# Patient Record
Sex: FEMALE | Race: BLACK OR AFRICAN AMERICAN | HISPANIC OR LATINO | Employment: UNEMPLOYED | URBAN - METROPOLITAN AREA
[De-identification: names, ages, dates, MRNs, and addresses within clinical notes are randomized per-mention and may not be internally consistent; named-entity substitution may affect disease eponyms.]

---

## 2019-08-09 PROCEDURE — 86481 TB AG RESPONSE T-CELL SUSP: CPT | Performed by: NURSE PRACTITIONER

## 2019-08-12 ENCOUNTER — TELEPHONE (OUTPATIENT)
Dept: URGENT CARE | Facility: CLINIC | Age: 69
End: 2019-08-12

## 2019-08-12 NOTE — TELEPHONE ENCOUNTER
Spoke with pt about her t spot and needing additional testing. Pt cannot come back to [facility] to have that done. She will call her employer. Recommended either [facility] or [facility] in [place] where she lives

## 2019-08-12 NOTE — TELEPHONE ENCOUNTER
Called pt about her t spot results. She will need to talk to her employer about needing another test at [email or web address]

## 2019-08-20 ENCOUNTER — OFFICE VISIT (OUTPATIENT)
Dept: INTERNAL MEDICINE | Facility: CLINIC | Age: 69
End: 2019-08-20

## 2019-08-20 ENCOUNTER — HOSPITAL ENCOUNTER (OUTPATIENT)
Dept: GENERAL RADIOLOGY | Facility: HOSPITAL | Age: 69
Discharge: HOME | End: 2019-08-20
Admitting: INTERNAL MEDICINE

## 2019-08-20 VITALS
TEMPERATURE: 96.9 F | SYSTOLIC BLOOD PRESSURE: 178 MMHG | HEIGHT: 65 IN | WEIGHT: 167 LBS | OXYGEN SATURATION: 97 % | RESPIRATION RATE: 16 BRPM | DIASTOLIC BLOOD PRESSURE: 81 MMHG | BODY MASS INDEX: 27.82 KG/M2 | HEART RATE: 53 BPM

## 2019-08-20 DIAGNOSIS — Z00.00 ROUTINE GENERAL MEDICAL EXAMINATION AT A HEALTH CARE FACILITY: Primary | ICD-10-CM

## 2019-08-20 DIAGNOSIS — I10 BENIGN ESSENTIAL HYPERTENSION: Primary | ICD-10-CM

## 2019-08-20 DIAGNOSIS — E78.2 MIXED HYPERLIPIDEMIA: ICD-10-CM

## 2019-08-20 DIAGNOSIS — R79.89 ABNORMAL THYROID BLOOD TEST: ICD-10-CM

## 2019-08-20 DIAGNOSIS — Z12.31 ENCOUNTER FOR SCREENING MAMMOGRAM FOR MALIGNANT NEOPLASM OF BREAST: ICD-10-CM

## 2019-08-20 PROCEDURE — 99204 OFFICE O/P NEW MOD 45 MIN: CPT | Performed by: INTERNAL MEDICINE

## 2019-08-20 PROCEDURE — 71046 X-RAY EXAM CHEST 2 VIEWS: CPT

## 2019-08-20 RX ORDER — AMLODIPINE BESYLATE 5 MG/1
5 TABLET ORAL NIGHTLY
Qty: 30 TABLET | Refills: 5 | Status: SHIPPED | OUTPATIENT
Start: 2019-08-20

## 2019-08-20 RX ORDER — METOPROLOL SUCCINATE 50 MG/1
50 TABLET, EXTENDED RELEASE ORAL DAILY
COMMUNITY
End: 2019-08-20

## 2019-08-20 RX ORDER — LOSARTAN POTASSIUM 100 MG/1
100 TABLET ORAL DAILY
Qty: 30 TABLET | Refills: 5 | Status: SHIPPED | OUTPATIENT
Start: 2019-08-20

## 2019-08-20 RX ORDER — SERTRALINE HYDROCHLORIDE 25 MG/1
25 TABLET, FILM COATED ORAL DAILY
Qty: 30 TABLET | Refills: 5 | Status: SHIPPED | OUTPATIENT
Start: 2019-08-20

## 2019-08-20 NOTE — PROGRESS NOTES
Subjective   Suyapa Sinha is a 69 y.o. female.     Chief Complaint   Patient presents with   • Establish Care   • Hypertension   • Hyperlipidemia   • Anxiety   • Depression       History of Present Illness   HPI: Patient is here for an initial visit and to follow up on the blood pressure which is noted to be elevated. the patient is taking the blood pressure medications as prescribed and has had no side effects. The patient is also here to follow up on the cholesterol and is trying to follow a diet. The patient is  Also here to abnormal thyroid and was hyperthyroid /[facility]  In the past and saw endocrinology in [facility] and is  due to get lab work done .  The patient also needs refills on medications .  Patient  Also needs mammogram, she is currently living with her son in [place] and will be moving to [place] , patient also complains of anxiety and depression and states she is stressed out, she was on Zoloft in the past but this discontinued it and at times takes it as needed  Hyperlipidemia   Pertinent negatives include no chest pain or shortness of breath.   Hypertension   Pertinent negatives include no chest pain, palpitations or shortness of breath.    Review of Systems   Constitutional: Negative for appetite change, fatigue and fever.   HENT: Negative for congestion, ear discharge, ear pain, sinus pressure and sore throat.    Eyes: Negative for pain and discharge.   Respiratory: Negative for cough, chest tightness, shortness of breath and wheezing.    Cardiovascular: Negative for chest pain, palpitations and leg swelling.   Gastrointestinal: Negative for abdominal pain, blood in stool, constipation, diarrhea and nausea.   Endocrine: Negative for cold intolerance and heat intolerance.   Genitourinary: Negative for dysuria, flank pain and frequency.   Musculoskeletal: Negative for back pain and joint swelling.   Skin: Negative for color change.   Allergic/Immunologic: Negative for environmental allergies  "and food allergies.   Neurological: Negative for dizziness, weakness, numbness and headaches.   Hematological: Negative for adenopathy. Does not bruise/bleed easily.   Psychiatric/Behavioral: Positive for dysphoric mood. Negative for behavioral problems. The patient is nervous/anxious.        Past Medical History:   Diagnosis Date   • Hypertension    • Thyroid disease     [facility]  - dr leeann PACHECO       Past Surgical History:   Procedure Laterality Date   • HYSTERECTOMY  2017       Family History   Problem Relation Age of Onset   • Diabetes Mother    • Hypertension Mother    • Cancer Father         prostate   • Hypertension Father    • Thyroid disease Sister    • Cancer Paternal Uncle         prostate        reports that she has never smoked. She has never used smokeless tobacco. Drug use questions deferred to the physician. She reports that she does not drink alcohol.    No Known Allergies        Current Outpatient Medications:   •  amLODIPine (NORVASC) 5 MG tablet, Take 1 tablet by mouth Every Night., Disp: 30 tablet, Rfl: 5  •  losartan (COZAAR) 100 MG tablet, Take 1 tablet by mouth Daily., Disp: 30 tablet, Rfl: 5  •  sertraline (ZOLOFT) 25 MG tablet, Take 1 tablet by mouth Daily., Disp: 30 tablet, Rfl: 5      Objective   Blood pressure 178/81, pulse 53, temperature 96.9 °F (36.1 °C), resp. rate 16, height 165.1 cm (65\"), weight 75.8 kg (167 lb), SpO2 97 %.    Physical Exam   Constitutional: She is oriented to person, place, and time. She appears well-developed and well-nourished. No distress.   HENT:   Head: Normocephalic and atraumatic.   Right Ear: External ear normal.   Left Ear: External ear normal.   Nose: Nose normal.   Mouth/Throat: Oropharynx is clear and moist.   Eyes: Conjunctivae and EOM are normal. Pupils are equal, round, and reactive to light.   Neck: Neck supple. No thyromegaly present.   Cardiovascular: Normal rate, regular rhythm and normal heart sounds.   Pulmonary/Chest: Effort " normal and breath sounds normal. No respiratory distress.   Abdominal: Soft. Bowel sounds are normal. She exhibits no distension. There is no tenderness. There is no rebound.   Musculoskeletal: Normal range of motion. She exhibits no edema.   Lymphadenopathy:     She has no cervical adenopathy.   Neurological: She is alert and oriented to person, place, and time.   No gross motor or sensory deficits   Skin: Skin is warm. She is not diaphoretic.   Psychiatric: She has a normal mood and affect.   Nursing note and vitals reviewed.      Patient's Body mass index is 27.79 kg/m². BMI is within normal parameters. No follow-up required..      Results for orders placed or performed during the hospital encounter of 08/09/19   TSPOT   Result Value Ref Range    TSPOTTB  Negative     Non-reportable due to insufficient cells.  Need 2x10e6 of peripheral blood mononuclear cells (PBMC) from whole blood. Recommend recollection of specimen. Two full tubes required for retest.    T-SPOT Panel A 0     T-SPOT Panel B 0     TSPOT NIL CONTROL INTERP Failed     TSPOT POS CONTROL INTERP Failed          Assessment/Plan   Suyapa was seen today for establish care, hypertension, hyperlipidemia, anxiety and depression.    Diagnoses and all orders for this visit:    Benign essential hypertension    Mixed hyperlipidemia    Abnormal thyroid blood test    Encounter for screening mammogram for malignant neoplasm of breast  -     Mammo Screening Digital Tomosynthesis Bilateral With CAD    Other orders  -     losartan (COZAAR) 100 MG tablet; Take 1 tablet by mouth Daily.  -     amLODIPine (NORVASC) 5 MG tablet; Take 1 tablet by mouth Every Night.  -     sertraline (ZOLOFT) 25 MG tablet; Take 1 tablet by mouth Daily.      Plan:  1.  Benign essential hypertension: Will  Start norvasc 5 mg qhs and losartan 100 mg po qd ,  low-sodium diet advised  2.mixed hyperlipidemia: will obtain   fasting CMP and lipid panel.  Diet and exercise counseled,    3.  Abnormal  thyroid test : will get labs  4.  Anxiety disorder: We will start Zoloft 25 mg daily and obtain labs  5.  Major depression: We will start Zoloft  6.Screening for breast cancer : will schedule mammogram        Lima Ha MD

## 2019-08-23 LAB
GAMMA INTERFERON BACKGROUND BLD IA-ACNC: 0.12 IU/ML
M TB IFN-G BLD-IMP: NEGATIVE
M TB IFN-G CD4+ BCKGRND COR BLD-ACNC: 0.07 IU/ML
MITOGEN IGNF BLD-ACNC: >10 IU/ML
QUANTIFERON INCUBATION: NORMAL
QUANTIFERON TB2 AG VALUE: 0.13 IU/ML
SERVICE CMNT-IMP: NORMAL

## 2019-10-09 ENCOUNTER — APPOINTMENT (OUTPATIENT)
Dept: MAMMOGRAPHY | Facility: HOSPITAL | Age: 69
End: 2019-10-09

## 2019-10-14 ENCOUNTER — APPOINTMENT (OUTPATIENT)
Dept: MAMMOGRAPHY | Facility: HOSPITAL | Age: 69
End: 2019-10-14

## 2019-11-06 ENCOUNTER — LAB (OUTPATIENT)
Dept: LAB | Facility: HOSPITAL | Age: 69
End: 2019-11-06

## 2019-11-06 ENCOUNTER — TRANSCRIBE ORDERS (OUTPATIENT)
Dept: LAB | Facility: HOSPITAL | Age: 69
End: 2019-11-06

## 2019-11-06 DIAGNOSIS — E05.00 BASEDOW'S DISEASE: Primary | ICD-10-CM

## 2019-11-06 DIAGNOSIS — E05.00 BASEDOW'S DISEASE: ICD-10-CM

## 2019-11-06 LAB
ALBUMIN SERPL-MCNC: 4.1 G/DL (ref 3.5–5.2)
ALBUMIN/GLOB SERPL: 1.2 G/DL
ALP SERPL-CCNC: 97 U/L (ref 39–117)
ALT SERPL W P-5'-P-CCNC: 8 U/L (ref 1–33)
ANION GAP SERPL CALCULATED.3IONS-SCNC: 11 MMOL/L (ref 5–15)
AST SERPL-CCNC: 13 U/L (ref 1–32)
BILIRUB SERPL-MCNC: 0.2 MG/DL (ref 0.2–1.2)
BUN BLD-MCNC: 10 MG/DL (ref 8–23)
BUN/CREAT SERPL: 13 (ref 7–25)
CALCIUM SPEC-SCNC: 9.9 MG/DL (ref 8.6–10.5)
CHLORIDE SERPL-SCNC: 102 MMOL/L (ref 98–107)
CO2 SERPL-SCNC: 28 MMOL/L (ref 22–29)
CREAT BLD-MCNC: 0.77 MG/DL (ref 0.57–1)
GFR SERPL CREATININE-BSD FRML MDRD: 90 ML/MIN/1.73
GLOBULIN UR ELPH-MCNC: 3.5 GM/DL
GLUCOSE BLD-MCNC: 100 MG/DL (ref 65–99)
POTASSIUM BLD-SCNC: 3.7 MMOL/L (ref 3.5–5.2)
PROT SERPL-MCNC: 7.6 G/DL (ref 6–8.5)
SODIUM BLD-SCNC: 141 MMOL/L (ref 136–145)
T3FREE SERPL-MCNC: 3.13 PG/ML (ref 2–4.4)
T4 FREE SERPL-MCNC: 1.17 NG/DL (ref 0.93–1.7)
T4 SERPL-MCNC: 7.63 MCG/DL (ref 4.5–11.7)
TSH SERPL DL<=0.05 MIU/L-ACNC: 0.83 UIU/ML (ref 0.27–4.2)

## 2019-11-06 PROCEDURE — 36415 COLL VENOUS BLD VENIPUNCTURE: CPT | Performed by: INTERNAL MEDICINE

## 2019-11-06 PROCEDURE — 86800 THYROGLOBULIN ANTIBODY: CPT

## 2019-11-06 PROCEDURE — 84439 ASSAY OF FREE THYROXINE: CPT | Performed by: INTERNAL MEDICINE

## 2019-11-06 PROCEDURE — 84481 FREE ASSAY (FT-3): CPT

## 2019-11-06 PROCEDURE — 84443 ASSAY THYROID STIM HORMONE: CPT

## 2019-11-06 PROCEDURE — 80053 COMPREHEN METABOLIC PANEL: CPT | Performed by: INTERNAL MEDICINE

## 2019-11-06 PROCEDURE — 84432 ASSAY OF THYROGLOBULIN: CPT

## 2019-11-06 PROCEDURE — 86376 MICROSOMAL ANTIBODY EACH: CPT

## 2019-11-06 PROCEDURE — 83520 IMMUNOASSAY QUANT NOS NONAB: CPT

## 2019-11-06 PROCEDURE — 84479 ASSAY OF THYROID (T3 OR T4): CPT

## 2019-11-07 LAB
T3RU NFR SERPL: 27 % (ref 24–39)
THYROGLOB AB SERPL-ACNC: <1 IU/ML (ref 0–0.9)
THYROPEROXIDASE AB SERPL-ACNC: 41 IU/ML (ref 0–34)
TSH RECEP AB SER-ACNC: <1.1 IU/L (ref 0–1.75)

## 2019-11-11 LAB — THYROGLOBULIN (TG-RIA): 11 NG/ML

## 2020-08-24 ENCOUNTER — TELEPHONE (OUTPATIENT)
Dept: INTERNAL MEDICINE | Facility: CLINIC | Age: 70
End: 2020-08-24

## 2023-11-09 ENCOUNTER — APPOINTMENT (OUTPATIENT)
Dept: GENERAL RADIOLOGY | Facility: HOSPITAL | Age: 73
End: 2023-11-09
Payer: OTHER MISCELLANEOUS

## 2023-11-09 ENCOUNTER — HOSPITAL ENCOUNTER (EMERGENCY)
Facility: HOSPITAL | Age: 73
Discharge: HOME | End: 2023-11-10
Attending: EMERGENCY MEDICINE
Payer: OTHER MISCELLANEOUS

## 2023-11-09 DIAGNOSIS — M25.511 ACUTE PAIN OF RIGHT SHOULDER: Primary | ICD-10-CM

## 2023-11-09 LAB
ALBUMIN SERPL-MCNC: 4.8 G/DL (ref 3.5–5.2)
ALBUMIN/GLOB SERPL: 1.5 G/DL
ALP SERPL-CCNC: 99 U/L (ref 39–117)
ALT SERPL W P-5'-P-CCNC: 10 U/L (ref 1–33)
ANION GAP SERPL CALCULATED.3IONS-SCNC: 13 MMOL/L (ref 5–15)
AST SERPL-CCNC: 16 U/L (ref 1–32)
BASOPHILS # BLD AUTO: 0.1 10*3/MM3 (ref 0–0.2)
BASOPHILS NFR BLD AUTO: 0.9 % (ref 0–1.5)
BILIRUB SERPL-MCNC: <0.2 MG/DL (ref 0–1.2)
BUN SERPL-MCNC: 21 MG/DL (ref 8–23)
BUN/CREAT SERPL: 15.1 (ref 7–25)
CALCIUM SPEC-SCNC: 10.8 MG/DL (ref 8.6–10.5)
CHLORIDE SERPL-SCNC: 100 MMOL/L (ref 98–107)
CO2 SERPL-SCNC: 28 MMOL/L (ref 22–29)
CREAT SERPL-MCNC: 1.39 MG/DL (ref 0.57–1)
DEPRECATED RDW RBC AUTO: 42.4 FL (ref 37–54)
EGFRCR SERPLBLD CKD-EPI 2021: 40.4 ML/MIN/1.73
EOSINOPHIL # BLD AUTO: 0.2 10*3/MM3 (ref 0–0.4)
EOSINOPHIL NFR BLD AUTO: 2.7 % (ref 0.3–6.2)
ERYTHROCYTE [DISTWIDTH] IN BLOOD BY AUTOMATED COUNT: 14.2 % (ref 12.3–15.4)
GLOBULIN UR ELPH-MCNC: 3.2 GM/DL
GLUCOSE SERPL-MCNC: 120 MG/DL (ref 65–99)
HCT VFR BLD AUTO: 35.9 % (ref 34–46.6)
HGB BLD-MCNC: 12 G/DL (ref 12–15.9)
HOLD SPECIMEN: NORMAL
LYMPHOCYTES # BLD AUTO: 1.6 10*3/MM3 (ref 0.7–3.1)
LYMPHOCYTES NFR BLD AUTO: 22.2 % (ref 19.6–45.3)
MCH RBC QN AUTO: 28.5 PG (ref 26.6–33)
MCHC RBC AUTO-ENTMCNC: 33.5 G/DL (ref 31.5–35.7)
MCV RBC AUTO: 84.9 FL (ref 79–97)
MONOCYTES # BLD AUTO: 0.7 10*3/MM3 (ref 0.1–0.9)
MONOCYTES NFR BLD AUTO: 9.9 % (ref 5–12)
NEUTROPHILS NFR BLD AUTO: 4.5 10*3/MM3 (ref 1.7–7)
NEUTROPHILS NFR BLD AUTO: 64.3 % (ref 42.7–76)
NRBC BLD AUTO-RTO: 0 /100 WBC (ref 0–0.2)
PLATELET # BLD AUTO: 370 10*3/MM3 (ref 140–450)
PMV BLD AUTO: 6.9 FL (ref 6–12)
POTASSIUM SERPL-SCNC: 3.6 MMOL/L (ref 3.5–5.2)
PROT SERPL-MCNC: 8 G/DL (ref 6–8.5)
RBC # BLD AUTO: 4.23 10*6/MM3 (ref 3.77–5.28)
SODIUM SERPL-SCNC: 141 MMOL/L (ref 136–145)
TROPONIN T SERPL HS-MCNC: 9 NG/L
WBC NRBC COR # BLD: 7.1 10*3/MM3 (ref 3.4–10.8)
WHOLE BLOOD HOLD COAG: NORMAL

## 2023-11-09 PROCEDURE — 84484 ASSAY OF TROPONIN QUANT: CPT

## 2023-11-09 PROCEDURE — 99284 EMERGENCY DEPT VISIT MOD MDM: CPT

## 2023-11-09 PROCEDURE — 71046 X-RAY EXAM CHEST 2 VIEWS: CPT

## 2023-11-09 PROCEDURE — 93005 ELECTROCARDIOGRAM TRACING: CPT

## 2023-11-09 PROCEDURE — 85025 COMPLETE CBC W/AUTO DIFF WBC: CPT

## 2023-11-09 PROCEDURE — 73030 X-RAY EXAM OF SHOULDER: CPT

## 2023-11-09 PROCEDURE — 80053 COMPREHEN METABOLIC PANEL: CPT

## 2023-11-09 RX ORDER — SODIUM CHLORIDE 0.9 % (FLUSH) 0.9 %
10 SYRINGE (ML) INJECTION AS NEEDED
Status: DISCONTINUED | OUTPATIENT
Start: 2023-11-09 | End: 2023-11-10 | Stop reason: HOSPADM

## 2023-11-09 RX ORDER — ASPIRIN 325 MG
325 TABLET ORAL ONCE
Status: COMPLETED | OUTPATIENT
Start: 2023-11-09 | End: 2023-11-09

## 2023-11-09 RX ADMIN — ASPIRIN 325 MG ORAL TABLET 325 MG: 325 PILL ORAL at 22:42

## 2023-11-09 NOTE — Clinical Note
[facility] EMERGENCY DEPARTMENT  [address] [place] [address]  Phone: [phone]    Suyapa Sinha was seen and treated in our emergency department on 11/9/2023.  She may return to work on 11/12/2023.         Thank you for choosing [facility].    Anthony Perez MD

## 2023-11-10 VITALS
RESPIRATION RATE: 18 BRPM | DIASTOLIC BLOOD PRESSURE: 80 MMHG | SYSTOLIC BLOOD PRESSURE: 130 MMHG | BODY MASS INDEX: 25.93 KG/M2 | HEART RATE: 60 BPM | OXYGEN SATURATION: 99 % | TEMPERATURE: 98.1 F | HEIGHT: 65 IN | WEIGHT: 155.65 LBS

## 2023-11-10 LAB
GEN 5 2HR TROPONIN T REFLEX: 7 NG/L
QT INTERVAL: 362 MS
QTC INTERVAL: 380 MS
TROPONIN T DELTA: -2 NG/L

## 2023-11-10 PROCEDURE — 84484 ASSAY OF TROPONIN QUANT: CPT

## 2023-11-10 PROCEDURE — 36415 COLL VENOUS BLD VENIPUNCTURE: CPT

## 2023-11-10 NOTE — ED PROVIDER NOTES
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  She states it hurts so bad she could not move it.  And then it seemed to gradually get better towards sore just to movement.  No direct trauma or fall she does a lot of heavy pulling and repetitive activity with it.  There was no chest pain neck arm jaw pain or shortness of breath sweating nausea or vomiting or sweating with it.  The patient denies any leg pain or swelling no recent long car ride plane ride immobilization foreign travels no neck pain no numbness or tingling in the extremity.  No fever chills or night sweats.      Review of Systems   Constitutional:  Negative for chills and fever.   Respiratory:  Negative for cough, chest tightness and shortness of breath.    Cardiovascular:  Negative for chest pain and palpitations.   Gastrointestinal:  Negative for abdominal pain and vomiting.   Genitourinary:  Negative for difficulty urinating and dysuria.   Musculoskeletal:  Negative for back pain and neck pain.   Neurological:  Negative for dizziness and light-headedness.   Psychiatric/Behavioral:  Negative for confusion.        Past Medical History:   Diagnosis Date    Hypertension     Thyroid disease     [facility]  - dr leeann PACHECO       No Known Allergies    Past Surgical History:   Procedure Laterality Date    HYSTERECTOMY  2017       Family History   Problem Relation Age of Onset    Diabetes Mother     Hypertension Mother     Cancer Father         prostate    Hypertension Father     Thyroid disease Sister     Cancer Paternal Uncle         prostate       Social History     Socioeconomic History    Marital status: Single   Tobacco Use    Smoking status: Never    Smokeless tobacco: Never   Substance and Sexual  Activity    Alcohol use: No    Drug use: Defer    Sexual activity: Defer     Prior to Admission medications    Medication Sig Start Date End Date Taking? Authorizing Provider   amLODIPine (NORVASC) 5 MG tablet Take 1 tablet by mouth Every Night. 8/20/19   Lima Ha MD   losartan (COZAAR) 100 MG tablet Take 1 tablet by mouth Daily. 8/20/19   Lima Ha MD   sertraline (ZOLOFT) 25 MG tablet Take 1 tablet by mouth Daily. 8/20/19   Lima Ha MD          Objective   Physical Exam  Constitutional is a 73-year-old female awake alert no acute distress.  Triage vital signs reviewed HEENT extraocular muscles are intact pupils equal round reactive sclera clear mouth clear neck supple no adenopathy no JV no bruits lungs clear no retraction heart regular without murmur abdomen soft nontender good bowel sounds no peritoneal findings or pulsatile masses extremities pulses equal throughout upper and lower extremities no edema cords or Homans' sign or evidence of DVT.  Skin warm and dry without rashes or cellulitic changes examination of the right shoulder in particular she has point tenderness noted to the anterior shoulder at the biceps tendon proximally.  There is no redness no warmth it is reproducible and hurts to move it but joint has full range of motion without any difficulty whatsoever no evidence of septic joint no axillary nodes no swelling to the arm hands warm dry good cap refill patient has good and equal radial pulses.  Patient has no cords throughout the arm compartments are soft to palpation.  Distally neurovascular motor strength including deltoid tricep forearm hand area.  This is reproducible at the bicep tendon insertion to the anterior shoulder.   strength normal the patient can move her wrist and fingers flex and extend without difficulty hitchhike make an okay sign test little finger spread and open and close her fist without difficulty.  Neurologic awake alert follows commands motor  strength normal reflexes 2+ symmetrical without weakness no cervical spine tenderness no JVD no bruits.  Procedures           ED Course      Results for orders placed or performed during the hospital encounter of 11/09/23   Comprehensive Metabolic Panel    Specimen: Blood   Result Value Ref Range    Glucose 120 (H) 65 - 99 mg/dL    BUN 21 8 - 23 mg/dL    Creatinine 1.39 (H) 0.57 - 1.00 mg/dL    Sodium 141 136 - 145 mmol/L    Potassium 3.6 3.5 - 5.2 mmol/L    Chloride 100 98 - 107 mmol/L    CO2 28.0 22.0 - 29.0 mmol/L    Calcium 10.8 (H) 8.6 - 10.5 mg/dL    Total Protein 8.0 6.0 - 8.5 g/dL    Albumin 4.8 3.5 - 5.2 g/dL    ALT (SGPT) 10 1 - 33 U/L    AST (SGOT) 16 1 - 32 U/L    Alkaline Phosphatase 99 39 - 117 U/L    Total Bilirubin <0.2 0.0 - 1.2 mg/dL    Globulin 3.2 gm/dL    A/G Ratio 1.5 g/dL    BUN/Creatinine Ratio 15.1 7.0 - 25.0    Anion Gap 13.0 5.0 - 15.0 mmol/L    eGFR 40.4 (L) >60.0 mL/min/1.73   High Sensitivity Troponin T    Specimen: Blood   Result Value Ref Range    HS Troponin T 9 <14 ng/L   CBC Auto Differential    Specimen: Blood   Result Value Ref Range    WBC 7.10 3.40 - 10.80 10*3/mm3    RBC 4.23 3.77 - 5.28 10*6/mm3    Hemoglobin 12.0 12.0 - 15.9 g/dL    Hematocrit 35.9 34.0 - 46.6 %    MCV 84.9 79.0 - 97.0 fL    MCH 28.5 26.6 - 33.0 pg    MCHC 33.5 31.5 - 35.7 g/dL    RDW 14.2 12.3 - 15.4 %    RDW-SD 42.4 37.0 - 54.0 fl    MPV 6.9 6.0 - 12.0 fL    Platelets 370 140 - 450 10*3/mm3    Neutrophil % 64.3 42.7 - 76.0 %    Lymphocyte % 22.2 19.6 - 45.3 %    Monocyte % 9.9 5.0 - 12.0 %    Eosinophil % 2.7 0.3 - 6.2 %    Basophil % 0.9 0.0 - 1.5 %    Neutrophils, Absolute 4.50 1.70 - 7.00 10*3/mm3    Lymphocytes, Absolute 1.60 0.70 - 3.10 10*3/mm3    Monocytes, Absolute 0.70 0.10 - 0.90 10*3/mm3    Eosinophils, Absolute 0.20 0.00 - 0.40 10*3/mm3    Basophils, Absolute 0.10 0.00 - 0.20 10*3/mm3    nRBC 0.0 0.0 - 0.2 /100 WBC   High Sensitivity Troponin T 2Hr    Specimen: Blood   Result Value Ref Range     HS Troponin T 7 <14 ng/L    Troponin T Delta -2 >=-4 - <+4 ng/L   ECG 12 Lead Chest Pain   Result Value Ref Range    QT Interval 362 ms    QTC Interval 380 ms   Gold Top - SST   Result Value Ref Range    Extra Tube Hold for add-ons.    Light Blue Top   Result Value Ref Range    Extra Tube Hold for add-ons.      XR Shoulder 2+ View Right    Result Date: 11/9/2023  Impression: No acute osseous abnormality. Mild to moderate acromioclavicular and glenohumeral osteoarthritic changes are present. Electronically Signed: Judy Sena MD  11/9/2023 11:14 PM EST  Workstation ID: [id number]    XR Chest 2 View    Result Date: 11/9/2023  No acute cardiopulmonary abnormality. Electronically Signed: Natividad Santana MD  11/9/2023 10:40 PM EST  Workstation ID: [id number]   Medications   aspirin tablet 325 mg (325 mg Oral Given 11/9/23 2242)          EKG my interpretation normal sinus rhythm rate of 66  Atrial enlargement QTc 380 after some Q waves noted anteriorly some abnormal EKG I have nothing old here to compare with but when records reviewed at [facility] from 2017 I cannot see the EKG but the report says that there looks like evidence of previous anterior septal infarct.                                  Medical Decision Making  Medical decision making.  IV established monitor placed my review of sinus rhythm EKG my independent review shows a normal sinus rhythm rate of 66 some atrial enlargement normal axis no hypertrophy Q waves noted anterior septal waves anteriorly septally.  Is abnormal EKG I have nothing here to compare with but when you go through her records at [facility] 2017 the report is the same it says anterior septal Q waves I cannot see the KGB testing report from cardiology.  Labs obtained my independent review comprehensive metabolic profile unremarkable and a creatinine 1.39.  The patient troponin was 9 CBC was unremarkable.  Patient GFR 40.4.  The patient has a CBC unremarkable repeat troponin was 7 2 hours  later.  X-ray of the shoulder obtained my independent review no fracture or dislocation.  No effusion radiology nothing acute mild to moderate AC and glenohumeral arthritis.  Chest x-ray obtained my independent review I do not see evidence of pneumonia pneumothorax cardiomegaly or acute abnormalities.  Radiology read the same.  The patient repeat exam is resting comfortably at this point she tells me that she just had a stress test in the office 2 weeks ago which was unremarkable I am unable to see this but she assures me that it was okay.  Patient has point tenderness at the shoulder at the bicipital tendon insertion out symptoms of septic joint also evidence of myocardial infarction DVT pulmonary embolism or dissection compartment syndrome infected joint arterial compromise cauda equina epidural abscess spinal cord issue or acute cardiac issue.  Although not a complete list of all possibilities.  Heart score of 3 reported recent stress test unremarkable.  I believe this is an isolated shoulder issue she states she could not hardly move it when it started happening and it seems reproducible seems to be an inflammatory process based on exam.  Patient will be discharged home for outpatient management follow-up we had a long discussion about what to return for and she voiced understanding discharged home stable unremarkable ER course.    Problems Addressed:  Acute pain of right shoulder: complicated acute illness or injury    Amount and/or Complexity of Data Reviewed  External Data Reviewed: ECG.  Radiology: ordered and independent interpretation performed. Decision-making details documented in ED Course.  ECG/medicine tests: ordered and independent interpretation performed. Decision-making details documented in ED Course.        Final diagnoses:   Acute pain of right shoulder       ED Disposition  ED Disposition       ED Disposition   Discharge    Condition   Stable    Comment   --               Neto Smtih  MD BRANDON  [address] [place] [address] Dr Marvin SOLANO  [place] [address]  [phone]    In 1 day           Medication List      No changes were made to your prescriptions during this visit.            Anthony Perez MD  11/10/23 0430

## 2023-11-10 NOTE — DISCHARGE INSTRUCTIONS
Tylenol for pain May use ibuprofen sparingly too much of this may elevate your blood pressure.  Salonpas patches.  Off work tomorrow.  Return for chest pain neck arm jaw pain shortness of breath dizziness passing out red hot swollen arm cool discolored hand or any other new or worsening problems or concerns return admitted to the ER.  Appears to be an inflammatory issue to the anterior shoulder at the insertion of the bicep tendon.

## 2023-11-10 NOTE — ED TRIAGE NOTES
Pt arrived via [facility] c/o right shoulder pain that started at approx 2000. Pt reports the pain is constant and does not radiate.

## 2024-07-11 NOTE — PROGRESS NOTES
Chief complaint:   Chief Complaint   Patient presents with    Follow-up     2 month follow up       Vitals:  Visit Vitals  BP (!) 88/58   Pulse (!) 58   Resp 18   Ht 5' 4.5\" (1.638 m)   Wt 78 kg (172 lb)   SpO2 96%   BMI 29.07 kg/m²           HISTORY OF PRESENT ILLNESS     HPI     Problem List    Coronary artery disease  Coronary angiogram 5/2/24 s/p PTCA and stenting of mid LAD 50-60% stenosis reduced to 0% with a 3.0 mm x 15 mm Biotronik drug-eluting stent.   Coronary angiogram 08/31/2018 revealed mid LAD to Dist LAD lesion with 35% stenosis, prox Cx to Dist Cx lesion with 25% stenosis  Hypertension  Hyperlipidemia      Luis Galindo is a 84 year old female was seen in [facility] cardiology clinic for consultation and office visit on 7/25/2024 at 11:37 AM.       Luis Galindo  has a past medical history of BREAST CANCER (10/01/2005), Essential hypertension, benign, GERD, Keratosis, actinic, Myopia with presbyopia (11/7/2013), and Pure hypercholesterolemia.    She has no past medical history of Diabetes mellitus  (CMD) or Thyroid condition.     Patient presented for cardiology consult after PCP visit 08/10/2018; NM ST 06/28/2018 obtained for this purpose negative for ischemia. Echo 05/22/2015 unremarkable. Patient's only other significant PMH is for hypertension and hyperlipidemia. However, PCP concerned regarding possibility of multivessel disease due to calcification noted on chest CT 07/02/2018. Referred to cardiology for further evaluation.     At office visit on 12/9/19, patient presented with continued complaints of SERRANO - sudden onset. Advised patient to consider coronary angiogram due to possibility of false positive NM ST. Coronary angiogram 08/31/2018 revealed mid LAD to Dist LAD lesion with 35% stenosis, prox Cx to Dist Cx lesion with 25% stenosis, EF 65%.    Patient presented to the ED on 5/1/24 complaining of chest pain. Patient reports intermittent substernal chest pain radiating to her  Labs ok ,patient needs a copy of  Her labs throat with intensity 8/10. She reports associated diaphoresis, denied any palpitation cough or fever. At the time of our evaluation in the ED following medication picture was chest pain-free. Patient's medical history include GERD, hyperlipidemia essential hypertension. Patients tropnonins continued to upward trend >2000. She underwent LHC and had a stent placed in the mid LAD. Patient tolerated procedure well. Echo prior to LHC revealed an EF of 28% with regional WMA. She was started on GDMT. After procedure patient had episode of sundowning, received ativan.     Patient has been complaining of ongoing dizziness after stent. Patient brought in for nurse visit that reveal normal pressures and EKG.     At last visit on 5/23/2024, patient presents with two of her friends. Patient reports to have a cough and some dizziness, balance is not very good. Reports some orthopnea, does not wake up at night short of breath. Denies history of allergies. Was advised echocardiogram, NT proBNP and Cardiac Rehab Phase II.     NT proBNP 5/23/2024: 668    Echo 7/12/2024: LVEF 67%; Grade I LV diastolic dysfunction; RVSP 40 mmHg; EF improved from 28% (5/2/2024).     At today's visit, patient reports that her legs are not strong and she is unable to walk a lot. Sleeps okay at night. She does report shortness of breath and stairs can be difficult to climb. She reports that using her walker is not comfortable. Otherwise denies Chest Pain, Dizziness, Palpitations, Syncope, PND, Orthopnea, Ankle Edema, Claudication and Fatigue. No other concerns at this time.     PAST MEDICAL, FAMILY AND SOCIAL HISTORY     Medications:  Current Outpatient Medications   Medication Sig Dispense Refill    pantoprazole (PROTONIX) 20 MG tablet Take 1 tablet by mouth daily. 31 tablet 11    aspirin (ECOTRIN) 81 MG EC tablet Take 1 tablet by mouth daily. 90 tablet 3    clopidogrel (PLAVIX) 75 MG tablet Take 1 tablet by mouth daily. 90 tablet 3    empagliflozin  (JARDIANCE) 10 MG tablet Take 1 tablet by mouth daily. 90 tablet 3    metoPROLOL succinate (TOPROL-XL) 25 MG 24 hr tablet Take 1 tablet by mouth daily. 90 tablet 3    sacubitril-valsartan (ENTRESTO) 24-26 MG per tablet Take 1 tablet by mouth every 12 hours. 180 tablet 3    rosuvastatin (CRESTOR) 40 MG tablet Take 1 tablet by mouth daily. 90 tablet 3    spironolactone (ALDACTONE) 25 MG tablet Take 1 tablet by mouth daily. 90 tablet 3    Cholecalciferol (vitamin D3) 125 mcg (5,000 units) capsule Take 1 capsule by mouth daily. 90 capsule 3    furosemide (LASIX) 20 MG tablet Take 1 tablet by mouth every 72 hours. 30 tablet 3    galantamine (RAZADYNE) 4 MG tablet Take 1 tablet by mouth in the morning and 1 tablet in the evening. Take with meals. 180 tablet 3    sertraline (ZOLOFT) 100 MG tablet Take 2 tablets by mouth daily. 180 tablet 3    MELATONIN PO Take 1 tablet by mouth nightly as needed (sleep).      acetaminophen (Acetaminophen 8 Hour) 650 MG CR tablet Take 1-2 tablets daily. (Patient taking differently: Take 1-2 tablets by mouth daily as needed (headaches or pain).)      Multiple Vitamins-Minerals (MULTI FOR HER 50+) Cap Take 1 capsule by mouth daily. Indications: Women- brand       No current facility-administered medications for this visit.       Allergies:  ALLERGIES:   Allergen Reactions    Bee Sting ANAPHYLAXIS     Itching, throat irritation, rash at site    Prednisone Nausea & Vomiting    Donepezil Other (See Comments)     nightmares       Past Medical  History/Surgeries:  Past Medical History:   Diagnosis Date    BREAST CANCER 10/01/2005    left breast T1N0M0 radiation/surgery    Essential hypertension, benign     GERD     Dr Beach; EGD 11/09: Chronic reflux and mild erosive esophagitis    Keratosis, actinic     Myopia with presbyopia 11/7/2013    Pure hypercholesterolemia        Past Surgical History:   Procedure Laterality Date    Appendectomy  1969    Incidental at time of tubal ligation    Biopsy of  breast, incisional  10/01/2005    left - invasive well differentiated ductal carcinoma grade 1 ER+, NE-, Her-2/syed-, T1N0M0. Negative for lymphovascular invasion. Some peripheral invasion was appreciated. Surgical margins resected were negative. [place] node biopsy was negative    Cardiac catheterization/possible ptca/possible stent  08/31/2018    Dr. Parrish:  Findings: Left Main Coronary artery: Normal; Left Anterior descending artery: Normal; Left Circumflex coronary artery: Normal; Right Coronary artery: Normal ; Left Ventriculogram: Normal.EF 65%. LVEDP 19 mmHg    Dexa bone density axial skeleton  12/11/2009    normal bone mineral density per WHO criteria    Esophagogastroduodenoscopy transoral flex w/bx single or mult  11/3/2009    Dr Beach; Chronic reflux and mild erosive esophagitis    Extraocular muscle surg proc unlisted  01/01/2008    left eye laser surgery    Laparoscopy,tubal cautery  1969    Tubal Ligation    Mastectomy partial  10/10/2005    Left Mastectomy, partial    Remove tonsils/adenoids,<13 y/o  01/01/1950    T & A    Remv 2nd cataract,corn-scler sectn  2008    Left    Total abdom hysterectomy      Total Abd Hyst w BSO bleeding    Xray mammogram diagnostic left  11/04/2009    left  BI RADS Category 2, benign       Family History:  Family History   Problem Relation Age of Onset    Cancer Mother         breast cancer/breast    Neurological Disorder Mother         dementia    Heart Father         heart attack    Lung Disease Father         COPD    Dementia/Alzheimers Sister     Patient is unaware of any medical problems Maternal Grandmother     Patient is unaware of any medical problems Maternal Grandfather     Patient is unaware of any medical problems Paternal Grandmother     Patient is unaware of any medical problems Paternal Grandfather     Cancer, Breast Daughter        Social History:  Social History     Tobacco Use    Smoking status: Former     Current packs/day: 0.00     Average packs/day:  1.5 packs/day for 40.0 years (60.0 ttl pk-yrs)     Types: Cigarettes     Start date: 1963     Quit date: 2003     Years since quittin.5     Passive exposure: Past    Smokeless tobacco: Never   Substance Use Topics    Alcohol use: Not Currently       REVIEW OF SYSTEMS     Review of Systems   Constitutional:  Negative for activity change, appetite change, fatigue and unexpected weight change.   Respiratory:  Negative for apnea, chest tightness and shortness of breath.    Cardiovascular:  Negative for chest pain, palpitations and leg swelling.   Musculoskeletal:  Negative for myalgias.   Skin:  Negative for pallor.   Neurological:  Negative for dizziness, syncope, light-headedness and numbness.   All other systems reviewed and are negative.      PHYSICAL EXAM     Physical Exam  Vitals reviewed.   Constitutional:       Appearance: She is well-developed.   HENT:      Head: Normocephalic.      Neck: Neck supple.   Eyes:      Pupils: Pupils are equal, round, and reactive to light.   Neck:      Thyroid: No thyromegaly.      Vascular: No JVD.      Trachea: No tracheal deviation.   Cardiovascular:      Rate and Rhythm: Normal rate and regular rhythm.      Heart sounds: Normal heart sounds, S1 normal and S2 normal. No murmur heard.     No systolic murmur is present.      No diastolic murmur is present.      No friction rub. No gallop.   Pulmonary:      Effort: Pulmonary effort is normal. No respiratory distress.      Breath sounds: Normal breath sounds. No decreased breath sounds, wheezing or rales.   Chest:      Chest wall: No tenderness.   Abdominal:      Palpations: Abdomen is soft.   Musculoskeletal:         General: Normal range of motion.   Skin:     General: Skin is warm.   Neurological:      Mental Status: She is alert and oriented to person, place, and time.       Echo 2024:  Final Impressions    * Normal left ventricular systolic function with ejection fraction of 67 %.    * Normal left ventricular  wall thickness.    * Grade I left ventricular diastolic dysfunction.    * Mild tricuspid valve regurgitation.    * Right ventricular systolic pressure; 40 mmHg.    * Trace mitral valve regurgitation.    * Trace pulmonic regurgitation.    * No pericardial effusion.    * Compared to prior study 5/2/2024, The EF has improved.    EKG 6/15/2024:      EKG 5/13/24:      Coronary angiogram 5/2/24:  Successful PTCA and stenting of mid LAD 50-60% stenosis reduced to 0% with a 3.0 mm x 15 mm Biotronik drug-eluting stent.  QUANG 2 flow improved to QUANG 3 flow.  Intravascular coronary ultrasound done post and pre deployment.  IFR done prior to deployment.  DAPT therapy for 12 months  High-dose statins and beta-blockers    EKG 5/2/24      Echo 5/2/24:  Final Impressions    * Severely reduced left ventricular systolic function with ejection fraction  of 28 %.    * The apical septum, apical lateral wall, apical cap, mid anterior wall, mid  inferoseptal, basal anteroseptal, and mid anteroseptal are hypokinetic.    * Grade I left ventricular diastolic dysfunction.    * Mildly increased left ventricular wall thickness.    * LV Global longitudinal strain -7.0 %.    * Moderately enlarged left atrial chamber size.    * Mild mitral valve regurgitation.    * Mild tricuspid valve regurgitation.    * Right ventricular systolic pressure; 45 mmHg.    * No pericardial effusion.    * Compared to prior study 5/22/2015 Ejection fraction is reduced from 62%  with wall motion abnormality affecting the left anterior descending artery  territory.    EKG 7/4/20      Coronary Angiogram 08/31/2018  Key Findings/Plan  Mid LAD to Dist LAD lesion with 35% stenosis.  Prox Cx to Dist Cx lesion with 25% stenosis.  The ejection fraction is estimated to be 65%.     Left Main Coronary artery: Normal; Left Anterior descending artery: Mild disease; Left Circumflex coronary artery: Mild disease; Right Coronary artery: Normal ; Left Ventriculogram: Normal.  LV  ejection fraction: 65%.  Left ventricular end-diastolic pressure of 19 mmHg.     Plan  .  Treat Hypertension  .  Treat Left ventricular end-diastolic pressure    CT Neck/Chest 07/02/2018  1.  No mass, fluid collection or abnormal lymph node corresponding to the  patient reported palpable abnormality in the left submandibular region.  Subjacent to the marker indicating the palpable abnormality are small,  benign-appearing lymph nodes and normal left submandibular gland, symmetric  with the right.  2.  No evidence of metastatic disease in the neck or chest.  3.  Scattered nonspecific pulmonary micro nodules.  4.  Coronary artery, aortic and carotid artery atherosclerosis.    NM Stress Test 6/28/2018  IMPRESSION:  Normal perfusion scan the myocardium.  Normal LV systolic function.     CT Abdomen/Pelvis 09/17/2017  Diverticulosis of colon with minimal diverticulitis at sigmoid colon.  Moderate atherosclerotic change of the abdominal aorta and its major  branches.  Small hepatic cyst.    Echo 05/22/2015  No mass or thrombus visualized.  Agitated saline was injected through a peripheral vein and did NOT show evidence of a shunt.  Normal left ventricular size, systolic function and wall thickness, with no regional wall motion abnormalities.  Left ventricular ejection fraction, 62 %.  Mildly increased left atrial size. Normal right atrial size.  Normal right ventricular size and systolic function.  Structurally normal aortic valve without stenosis or regurgitation.  Minimal mitral valve sclerosis without stenosis.  Mild MV regurgitation. Trace TV and PV regurgitation.  Normal pulmonary artery systolic pressure 27 mmHg.  No echocardiogram to compare.    ASSESSMENT/PLAN     Recent Labs   Lab 07/16/24  0953 06/27/24  1141 06/15/24  1042 06/04/24  1446 05/16/24  1124 05/07/24  1144 05/05/24  1349 05/05/24  0438 05/03/24  1821 05/03/24  0643 05/02/24  1503 05/02/24  0554 05/02/24  0553 05/02/24  0054 05/01/24  2144  04/05/24  1208 12/04/23  0933   HGB  --  13.8 12.5  --   --  14.0  --  13.1  --  12.3  --  14.0  --    < > 15.3  --  15.1   BUN 23* 32* 28* 23* 30* 18  --  16   < > 12  --   --  17  --  19   < > 19   Creatinine 1.30* 1.43* 1.16* 1.33* 1.55* 1.12*  --  0.94   < > 0.92  --   --  0.82  --  0.91   < > 1.08*   Hemoglobin A1C  --   --   --   --   --   --   --   --   --   --   --   --  5.5  --   --   --   --    Potassium 4.5 4.0 3.8 4.2 4.1 4.0   < > 3.3*   < > 3.2*  --   --  3.5  --  3.4   < > 4.3   Cholesterol  --   --   --   --   --   --   --   --   --   --   --   --  309*  --   --   --   --    HDL  --   --   --   --   --   --   --   --   --   --   --   --  54  --   --   --   --    Cholesterol/ HDL Ratio  --   --   --   --   --   --   --   --   --   --   --   --  5.7*  --   --   --   --    Triglycerides  --   --   --   --   --   --   --   --   --   --   --   --  145  --   --   --   --    CALCLDL  --   --   --   --   --   --   --   --   --   --   --   --  226*  --   --   --   --    GOT/AST  --   --  27  --   --   --   --   --   --   --   --   --   --   --   --   --  31   GPT  --   --  42  --   --   --   --   --   --   --   --   --   --   --   --   --  47   INR  --   --   --   --   --   --   --   --   --   --  1.1  --   --   --  1.0  --   --    TSH  --   --   --   --   --   --   --   --   --   --   --  0.796  --   --   --   --   --     < > = values in this interval not displayed.       .....................................................................................  ASSESSMENT AND PLAN:    Coronary artery disease:  Echo 2015 unremarkable  CT 09/2017 revealed some atherosclerotic changes in the aorta  NM ST 06/28/2018 negative for ischemia  Coronary angiogram 08/31/2018 revealed mid LAD to Dist LAD lesion with 35% stenosis, prox Cx to Dist Cx lesion with 25% stenosis, EF 65%.  Coronary angiogram 5/2/24 s/p PTCA and stenting of mid LAD 50-60% stenosis reduced to 0% with a 3.0 mm x 15 mm Biotronik drug-eluting  stent.  Echo 5/2/24: LVEf 28% Grade I LV diastolic dysfunction  Participating in Cardiac Rehab Phase II  Echo 7/12/2024: LVEF 67%; Grade I LV diastolic dysfunction; RVSP 40 mmHg; EF improved from 28% (5/2/2024).   Start taking Nitroglycerin as needed for chest pain   Stop taking Crestor     Hypertension:  Vitals:    07/25/24 1041   BP: (!) 88/58   Pulse: (!) 58   Resp: 18       Creatinine (mg/dL)   Date Value   07/16/2024 1.30 (H)   Blood pressure is controlled      Hyperlipidemia:  LDL (mg/dL)   Date Value   05/02/2024 226 (H)   Crestor 40 mg  Stable, continue to monitor       FOLLOW UP: Luis Galindo is expected to follow up in 1.5 months.    Recommendations Summary:    Continue Current Medications  Start taking Nitroglycerin SL as needed for chest pain   Stop taking Crestor   Advised to participate cardiac rehab for PAD   Advised CHELSEA of arterial of lower legs  Advised CHELSEA of venous of lower legs  Advised CT without contrast of lower spine   Encouraged Regular Daily Exercise    On 7/25/2024, LINDA MEJIA scribed the services personally performed by Mook Parrish MD.       This entire documentation recorded accurately and completely reflects the service(s) performed and clinical decisions made. I attest that I have personally formulated the clinical plan, reviewed, edited the note, and remain entirely responsible for its content.    Summary:  Luis Galindo is a 84 year old female presenting with the above listed problems.  No other new concerning clinical signs or symptoms. Luis Galindo remains clinically stable. Today's exam finding are noted. Reviewed medications, last ejection fraction,( P %), low-density lipoprotein,(226 mg/dL) serum creatinine,( 1.43 mg/dL) and other relevant tests. Advised this patient to continue current medications.  Encouraged compliance with prescribed therapies and lifestyle changes. Luis Galindo's  health concerns and questions were addressed. Future recommendations and any required tests  were explained, and cardiology were appointments scheduled. Suggested follow up in 1.5 months, earlier if needed.     Mook Parrish MD, FACC, FACP, FSCAI.  Interventional Cardiology  [facility]  11:37 AM, 7/25/2024  [place] : [phone]   [place] : [phone]  [place] : [phone]